# Patient Record
Sex: FEMALE | Race: WHITE | Employment: OTHER | URBAN - NONMETROPOLITAN AREA
[De-identification: names, ages, dates, MRNs, and addresses within clinical notes are randomized per-mention and may not be internally consistent; named-entity substitution may affect disease eponyms.]

---

## 2023-08-05 ENCOUNTER — HOSPITAL ENCOUNTER (EMERGENCY)
Facility: HOSPITAL | Age: 68
Discharge: HOME | End: 2023-08-05
Attending: EMERGENCY MEDICINE
Payer: MEDICARE

## 2023-08-05 VITALS
OXYGEN SATURATION: 100 % | HEART RATE: 100 BPM | RESPIRATION RATE: 18 BRPM | DIASTOLIC BLOOD PRESSURE: 87 MMHG | SYSTOLIC BLOOD PRESSURE: 133 MMHG | TEMPERATURE: 98.6 F

## 2023-08-05 DIAGNOSIS — F22 DELUSIONS OF PARASITOSIS (HCC): Primary | ICD-10-CM

## 2023-08-05 PROCEDURE — 99283 EMERGENCY DEPT VISIT LOW MDM: CPT

## 2023-08-05 ASSESSMENT — PAIN SCALES - GENERAL: PAINLEVEL_OUTOF10: 9

## 2023-08-05 ASSESSMENT — PAIN - FUNCTIONAL ASSESSMENT: PAIN_FUNCTIONAL_ASSESSMENT: 0-10

## 2023-08-05 NOTE — ED PROVIDER NOTES
[facility] EMERGENCY DEPT  EMERGENCY DEPARTMENT HISTORY AND PHYSICAL EXAM        Pt Name: Jessica Barajas  MRN: [id number]  [address] [facility] 1955  Date of evaluation: 8/5/2023  Provider: Michelle Gruber DO      History of Presenting Illness         Chief Complaint   Patient presents with    Skin Problem       History was provided by: Patient    Location/Duration/Severity/Modifying factors   Jessica Barajas is a 79 y.o. female who arrived to the emergency department by by private vehicle with a complaint Skin Problem        Patient is a 72-year-old female who presents with a chief complaint of for insect infestation. She says that for the past 4 months she has been seeing bugs burrowing into her skin and been itching all over her skin. She was seen by her PCP who referred her to psychiatry. She says she is seeing the bugs on her curtains however the picture she shows me her just small designs in the curtain and not actually bugs she does show me a picture of a bug that is consistent with a dragonfly. She says that she was on some kind of cream that started with \"M\" and she reports she has been on antibiotics as well. She denies any systemic symptoms. She reports that urgent care did some home stool test that confirmed the presence of parasites in her stool. Patient states she previously drank alcohol but has not in about 5 years. She denies meth use, denies any SI HI or AVH. There are no other complaints, changes, or physical findings at this time. PCP: No primary care provider on file. No current facility-administered medications for this encounter. Current Outpatient Medications   Medication Sig Dispense Refill    permethrin (ELIMITE) 1 % lotion Follow package directions. 59 mL 0       Past History     Past Medical History:  History reviewed. No pertinent past medical history. Past Surgical History:  History reviewed. No pertinent surgical history. Family History:  History reviewed.  No pertinent family history. Social History: Allergies:  No Known Allergies    Medications:  No current facility-administered medications for this encounter. Current Outpatient Medications   Medication Sig Dispense Refill    permethrin (ELIMITE) 1 % lotion Follow package directions. 59 mL 0       Social Determinants of Health:  Social Determinants of Health     Tobacco Use: Not on file   Alcohol Use: Not on file   Financial Resource Strain: Not on file   Food Insecurity: Not on file   Transportation Needs: Not on file   Physical Activity: Not on file   Stress: Not on file   Social Connections: Not on file   Intimate Partner Violence: Not on file   Depression: Not on file   Housing Stability: Not on file           Physical Exam     Physical Exam  Constitutional:       General: She is not in acute distress. Appearance: Normal appearance. Comments: Thin appearing and disheveled, appears older than stated age. HENT:      Head: Normocephalic and atraumatic. Right Ear: External ear normal.      Left Ear: External ear normal.      Nose: Nose normal.      Mouth/Throat:      Pharynx: Oropharynx is clear. Eyes:      Conjunctiva/sclera: Conjunctivae normal.   Cardiovascular:      Rate and Rhythm: Normal rate and regular rhythm. Pulses: Normal pulses. Pulmonary:      Effort: Pulmonary effort is normal.   Abdominal:      General: Abdomen is flat. There is no distension. Musculoskeletal:         General: No swelling or tenderness. Normal range of motion. Cervical back: Normal range of motion and neck supple. No tenderness. Skin:     General: Skin is warm and dry. Capillary Refill: Capillary refill takes less than 2 seconds. Comments: There are a couple of scabs on the hand, they appear remote. I do not appreciate any insects. There is no induration, erythema or fluctuance. No purulence. I see no track marks   Neurological:      General: No focal deficit present.       Mental Status:

## 2023-08-05 NOTE — ED TRIAGE NOTES
PT reports \"biting lice\". PT reports infestation at place she Is staying. PT reports she can find black bugs all over her causing a generalized rash. No rash noted where pt showed writer skin.  PT also c/o right shoulder pain

## 2024-09-12 ENCOUNTER — OFFICE VISIT (OUTPATIENT)
Age: 69
End: 2024-09-12
Payer: MEDICARE

## 2024-09-12 VITALS
HEART RATE: 101 BPM | RESPIRATION RATE: 18 BRPM | BODY MASS INDEX: 19.48 KG/M2 | TEMPERATURE: 97.2 F | HEIGHT: 60 IN | WEIGHT: 99.2 LBS | OXYGEN SATURATION: 95 % | SYSTOLIC BLOOD PRESSURE: 116 MMHG | DIASTOLIC BLOOD PRESSURE: 67 MMHG

## 2024-09-12 DIAGNOSIS — F17.210 CIGARETTE NICOTINE DEPENDENCE WITHOUT COMPLICATION: ICD-10-CM

## 2024-09-12 DIAGNOSIS — I21.9 SILENT MYOCARDIAL INFARCTION (HCC): ICD-10-CM

## 2024-09-12 DIAGNOSIS — K70.30 ALCOHOLIC CIRRHOSIS OF LIVER WITHOUT ASCITES (HCC): ICD-10-CM

## 2024-09-12 DIAGNOSIS — F10.10 CHRONIC ALCOHOL ABUSE: ICD-10-CM

## 2024-09-12 DIAGNOSIS — N39.0 RECURRENT UTI: ICD-10-CM

## 2024-09-12 DIAGNOSIS — K27.9 PUD (PEPTIC ULCER DISEASE): Primary | ICD-10-CM

## 2024-09-12 DIAGNOSIS — Z99.81 ON HOME O2: ICD-10-CM

## 2024-09-12 DIAGNOSIS — K70.30 ALCOHOLIC CIRRHOSIS OF LIVER WITHOUT ASCITES (HCC): Primary | ICD-10-CM

## 2024-09-12 PROCEDURE — G8400 PT W/DXA NO RESULTS DOC: HCPCS | Performed by: INTERNAL MEDICINE

## 2024-09-12 PROCEDURE — 1123F ACP DISCUSS/DSCN MKR DOCD: CPT | Performed by: INTERNAL MEDICINE

## 2024-09-12 PROCEDURE — 3017F COLORECTAL CA SCREEN DOC REV: CPT | Performed by: INTERNAL MEDICINE

## 2024-09-12 PROCEDURE — 4004F PT TOBACCO SCREEN RCVD TLK: CPT | Performed by: INTERNAL MEDICINE

## 2024-09-12 PROCEDURE — G8420 CALC BMI NORM PARAMETERS: HCPCS | Performed by: INTERNAL MEDICINE

## 2024-09-12 PROCEDURE — 99204 OFFICE O/P NEW MOD 45 MIN: CPT | Performed by: INTERNAL MEDICINE

## 2024-09-12 PROCEDURE — 1090F PRES/ABSN URINE INCON ASSESS: CPT | Performed by: INTERNAL MEDICINE

## 2024-09-12 PROCEDURE — G8427 DOCREV CUR MEDS BY ELIG CLIN: HCPCS | Performed by: INTERNAL MEDICINE

## 2024-09-12 RX ORDER — ALBUTEROL SULFATE 90 UG/1
AEROSOL, METERED RESPIRATORY (INHALATION) PRN
COMMUNITY

## 2024-09-12 RX ORDER — MUPIROCIN 20 MG/G
OINTMENT TOPICAL 3 TIMES DAILY
COMMUNITY
Start: 2023-07-21

## 2024-09-12 RX ORDER — MELOXICAM 7.5 MG/1
7.5 TABLET ORAL
COMMUNITY

## 2024-09-12 RX ORDER — OXYCODONE HYDROCHLORIDE 10 MG/1
10 TABLET ORAL EVERY 4 HOURS PRN
COMMUNITY

## 2024-09-12 ASSESSMENT — PATIENT HEALTH QUESTIONNAIRE - PHQ9
SUM OF ALL RESPONSES TO PHQ QUESTIONS 1-9: 0
1. LITTLE INTEREST OR PLEASURE IN DOING THINGS: NOT AT ALL
2. FEELING DOWN, DEPRESSED OR HOPELESS: NOT AT ALL
SUM OF ALL RESPONSES TO PHQ9 QUESTIONS 1 & 2: 0
SUM OF ALL RESPONSES TO PHQ QUESTIONS 1-9: 0

## 2024-09-15 LAB
A2 MACROGLOB SERPL-MCNC: 183 MG/DL (ref 110–276)
ALT SERPL W P-5'-P-CCNC: 61 IU/L (ref 0–40)
AMMONIA PLAS-MCNC: 38 UG/DL (ref 34–178)
APO A-I SERPL-MCNC: 179 MG/DL (ref 116–209)
AST SERPL W P-5'-P-CCNC: 115 IU/L (ref 0–40)
BILIRUB SERPL-MCNC: 0.2 MG/DL (ref 0–1.2)
CHOLEST SERPL-MCNC: 159 MG/DL (ref 100–199)
FIBROSIS SCORING:: ABNORMAL
FIBROSIS STAGE SERPL QL: ABNORMAL
GGT SERPL-CCNC: 52 IU/L (ref 0–60)
GLUCOSE SERPL-MCNC: 97 MG/DL (ref 70–99)
HAPTOGLOB SERPL-MCNC: 47 MG/DL (ref 37–355)
LABORATORY COMMENT REPORT: ABNORMAL
LIVER FIBR SCORE SERPL CALC.FIBROSURE: 0.19 (ref 0–0.21)
LIVER STEATOSIS GRADE SERPL QL: ABNORMAL
LIVER STEATOSIS SCORE SERPL: 0.46 (ref 0–0.4)
NASH GRADE SERPL QL: ABNORMAL
NASH INTERPRETATION SERPL-IMP: ABNORMAL
NASH SCORE SERPL: 0.84 (ref 0–0.25)
NASH SCORING: ABNORMAL
STEATOSIS SCORING: ABNORMAL
TEST PERFORMANCE INFO SPEC: ABNORMAL
TEST PERFORMANCE INFO SPEC: ABNORMAL
TRIGL SERPL-MCNC: 75 MG/DL (ref 0–149)

## 2024-09-16 ENCOUNTER — TELEPHONE (OUTPATIENT)
Age: 69
End: 2024-09-16

## 2024-09-18 ENCOUNTER — TELEPHONE (OUTPATIENT)
Age: 69
End: 2024-09-18

## 2024-09-29 PROBLEM — F10.10 CHRONIC ALCOHOL ABUSE: Status: ACTIVE | Noted: 2024-09-29
